# Patient Record
Sex: FEMALE | Race: WHITE | Employment: FULL TIME | URBAN - METROPOLITAN AREA
[De-identification: names, ages, dates, MRNs, and addresses within clinical notes are randomized per-mention and may not be internally consistent; named-entity substitution may affect disease eponyms.]

---

## 2020-03-16 PROCEDURE — 88305 TISSUE EXAM BY PATHOLOGIST: CPT | Performed by: RADIOLOGY

## 2020-03-16 PROCEDURE — 88360 TUMOR IMMUNOHISTOCHEM/MANUAL: CPT | Performed by: RADIOLOGY

## 2020-03-16 PROCEDURE — 88377 M/PHMTRC ALYS ISHQUANT/SEMIQ: CPT | Performed by: RADIOLOGY

## 2020-03-26 PROBLEM — Z17.0 MALIGNANT NEOPLASM OF UPPER-OUTER QUADRANT OF LEFT BREAST IN FEMALE, ESTROGEN RECEPTOR POSITIVE (HCC): Status: ACTIVE | Noted: 2020-03-26

## 2020-03-26 PROBLEM — C50.412 MALIGNANT NEOPLASM OF UPPER-OUTER QUADRANT OF LEFT BREAST IN FEMALE, ESTROGEN RECEPTOR POSITIVE (HCC): Status: ACTIVE | Noted: 2020-03-26

## 2020-04-07 PROBLEM — Z51.11 ENCOUNTER FOR ANTINEOPLASTIC CHEMOTHERAPY: Status: ACTIVE | Noted: 2020-04-07

## 2020-06-18 ENCOUNTER — TELEPHONE (OUTPATIENT)
Dept: GENETICS | Facility: HOSPITAL | Age: 60
End: 2020-06-18

## 2020-06-18 NOTE — TELEPHONE ENCOUNTER
Spoke to Nakia Chandler as she had called inquiring about scheduling an appointment for genetic counseling.  Confirmed that it is typically covered by insurance as a specialty visit, and given that her daughter has a reported BRCA mutation, she would certainly quali

## 2020-08-20 ENCOUNTER — GENETICS ENCOUNTER (OUTPATIENT)
Dept: GENETICS | Facility: HOSPITAL | Age: 60
End: 2020-08-20
Attending: GENETIC COUNSELOR, MS
Payer: COMMERCIAL

## 2020-08-20 ENCOUNTER — NURSE ONLY (OUTPATIENT)
Dept: HEMATOLOGY/ONCOLOGY | Facility: HOSPITAL | Age: 60
End: 2020-08-20
Attending: GENETIC COUNSELOR, MS
Payer: COMMERCIAL

## 2020-08-20 DIAGNOSIS — C50.412 MALIGNANT NEOPLASM OF UPPER-OUTER QUADRANT OF LEFT BREAST IN FEMALE, ESTROGEN RECEPTOR POSITIVE (HCC): Primary | ICD-10-CM

## 2020-08-20 DIAGNOSIS — Z80.3 FAMILY HISTORY OF MALIGNANT NEOPLASM OF BREAST: ICD-10-CM

## 2020-08-20 DIAGNOSIS — Z17.0 MALIGNANT NEOPLASM OF UPPER-OUTER QUADRANT OF LEFT BREAST IN FEMALE, ESTROGEN RECEPTOR POSITIVE (HCC): Primary | ICD-10-CM

## 2020-08-20 PROCEDURE — 96040 HC GENETIC COUNSELING EA 30 MIN: CPT | Performed by: GENETIC COUNSELOR, MS

## 2020-08-20 PROCEDURE — 36415 COLL VENOUS BLD VENIPUNCTURE: CPT

## 2020-08-20 NOTE — PROGRESS NOTES
Reason for visit: Mrs. Deandre Barnes is a 17-year-old woman referred to genetic counseling due to her recent diagnosis of breast cancer and family history of breast cancer to discuss the option of genetic testing and how this may help with her management and aracelis Common Hereditary Cancers Panel) and which yielded negative results for all genes tested. She reports that her mother was also diagnosed with cervical cancer (age 39) and a maternal half-uncle who was a smoker passed away from lung cancer.   She is unaware develop cancer, rather that there is an increased chance for developing certain types of cancer. Cancer predisposing gene mutations can exist in males and females and can be passed on to both male and female offspring.   The pros and cons of cancer predisp Surinder. Due to her diagnosis of breast cancer in conjunction with her daughter’s early-onset breast cancer, she meets NCCN criteria for genetic testing.     Based on the above history and our discussion of genetic testing options, Mrs. Saul Evans decided to

## 2020-08-31 ENCOUNTER — TELEPHONE (OUTPATIENT)
Dept: GENETICS | Facility: HOSPITAL | Age: 60
End: 2020-08-31

## 2020-08-31 NOTE — TELEPHONE ENCOUNTER
LM for Ravi Adorno that genetic testing results are negative for all genes tested aside from VUS in BRIP1 ([id number]), but this does not in any way change her clinical management. She opted for 84 gene panel through [facility]-Cancer Panel).  Reassure

## 2020-11-05 ENCOUNTER — TELEPHONE (OUTPATIENT)
Dept: GENERAL RADIOLOGY | Facility: HOSPITAL | Age: 60
End: 2020-11-05

## 2020-11-05 NOTE — TELEPHONE ENCOUNTER
1120:  Attempt to contact for procedure explanation and instructions- unable to leave message voice mail full.

## 2020-11-09 ENCOUNTER — LAB ENCOUNTER (OUTPATIENT)
Dept: LAB | Age: 60
End: 2020-11-09
Attending: SURGERY
Payer: COMMERCIAL

## 2020-11-09 DIAGNOSIS — Z17.0 MALIGNANT NEOPLASM OF UPPER-OUTER QUADRANT OF LEFT BREAST IN FEMALE, ESTROGEN RECEPTOR POSITIVE (HCC): ICD-10-CM

## 2020-11-09 DIAGNOSIS — C50.412 MALIGNANT NEOPLASM OF UPPER-OUTER QUADRANT OF LEFT BREAST IN FEMALE, ESTROGEN RECEPTOR POSITIVE (HCC): ICD-10-CM

## 2020-11-10 NOTE — IMAGING NOTE
Spoke with pt and explanation given re wire loc done prior to surgery. Pt scheduled for sarika mastectomy and wire loc to axillary lymph node.  Told pt will start in preop and will travel to [facility] via wheelchair for procedure to be done by radiologist. Will retu

## 2020-11-12 ENCOUNTER — HOSPITAL ENCOUNTER (OUTPATIENT)
Facility: HOSPITAL | Age: 60
Discharge: HOME | End: 2020-11-13
Attending: SURGERY | Admitting: SURGERY
Payer: COMMERCIAL

## 2020-11-12 ENCOUNTER — HOSPITAL ENCOUNTER (OUTPATIENT)
Dept: MAMMOGRAPHY | Facility: HOSPITAL | Age: 60
Discharge: HOME | End: 2020-11-12
Attending: SURGERY
Payer: COMMERCIAL

## 2020-11-12 ENCOUNTER — ANESTHESIA (OUTPATIENT)
Dept: SURGERY | Facility: HOSPITAL | Age: 60
End: 2020-11-12
Payer: COMMERCIAL

## 2020-11-12 ENCOUNTER — ANESTHESIA EVENT (OUTPATIENT)
Dept: SURGERY | Facility: HOSPITAL | Age: 60
End: 2020-11-12
Payer: COMMERCIAL

## 2020-11-12 ENCOUNTER — APPOINTMENT (OUTPATIENT)
Dept: MAMMOGRAPHY | Facility: HOSPITAL | Age: 60
End: 2020-11-12
Attending: SURGERY
Payer: COMMERCIAL

## 2020-11-12 DIAGNOSIS — Z17.0 MALIGNANT NEOPLASM OF UPPER-OUTER QUADRANT OF LEFT BREAST IN FEMALE, ESTROGEN RECEPTOR POSITIVE (HCC): Primary | ICD-10-CM

## 2020-11-12 DIAGNOSIS — C50.912 MALIGNANT NEOPLASM OF LEFT BREAST IN FEMALE, ESTROGEN RECEPTOR POSITIVE, UNSPECIFIED SITE OF BREAST (HCC): ICD-10-CM

## 2020-11-12 DIAGNOSIS — Z17.0 MALIGNANT NEOPLASM OF LEFT BREAST IN FEMALE, ESTROGEN RECEPTOR POSITIVE, UNSPECIFIED SITE OF BREAST (HCC): ICD-10-CM

## 2020-11-12 DIAGNOSIS — C50.412 MALIGNANT NEOPLASM OF UPPER-OUTER QUADRANT OF LEFT BREAST IN FEMALE, ESTROGEN RECEPTOR POSITIVE (HCC): Primary | ICD-10-CM

## 2020-11-12 PROCEDURE — 07T60ZZ RESECTION OF LEFT AXILLARY LYMPHATIC, OPEN APPROACH: ICD-10-PCS | Performed by: PLASTIC SURGERY

## 2020-11-12 PROCEDURE — 88344 IMHCHEM/IMCYTCHM EA MLT ANTB: CPT | Performed by: SURGERY

## 2020-11-12 PROCEDURE — 0HUV0KZ SUPPLEMENT BILATERAL BREAST WITH NONAUTOLOGOUS TISSUE SUBSTITUTE, OPEN APPROACH: ICD-10-PCS | Performed by: SURGERY

## 2020-11-12 PROCEDURE — 77065 DX MAMMO INCL CAD UNI: CPT | Performed by: SURGERY

## 2020-11-12 PROCEDURE — 0HHV0NZ INSERTION OF TISSUE EXPANDER INTO BILATERAL BREAST, OPEN APPROACH: ICD-10-PCS | Performed by: SURGERY

## 2020-11-12 PROCEDURE — 76942 ECHO GUIDE FOR BIOPSY: CPT | Performed by: ANESTHESIOLOGY

## 2020-11-12 PROCEDURE — 88360 TUMOR IMMUNOHISTOCHEM/MANUAL: CPT | Performed by: SURGERY

## 2020-11-12 PROCEDURE — 3E0T3BZ INTRODUCTION OF ANESTHETIC AGENT INTO PERIPHERAL NERVES AND PLEXI, PERCUTANEOUS APPROACH: ICD-10-PCS | Performed by: ANESTHESIOLOGY

## 2020-11-12 PROCEDURE — 10035 PLMT SFT TISS LOCLZJ DEV 1ST: CPT | Performed by: SURGERY

## 2020-11-12 PROCEDURE — 0HTV0ZZ RESECTION OF BILATERAL BREAST, OPEN APPROACH: ICD-10-PCS | Performed by: PLASTIC SURGERY

## 2020-11-12 PROCEDURE — 88307 TISSUE EXAM BY PATHOLOGIST: CPT | Performed by: SURGERY

## 2020-11-12 PROCEDURE — 76098 X-RAY EXAM SURGICAL SPECIMEN: CPT | Performed by: SURGERY

## 2020-11-12 DEVICE — MATRIX ALLODERM SELECT MEDIUM: Type: IMPLANTABLE DEVICE | Site: BREAST | Status: FUNCTIONAL

## 2020-11-12 DEVICE — GRAFT ALLODERM CONTOUR LG PERF: Type: IMPLANTABLE DEVICE | Site: BREAST | Status: FUNCTIONAL

## 2020-11-12 DEVICE — MATRIX ALLODERM SELECT: Type: IMPLANTABLE DEVICE | Site: BREAST | Status: FUNCTIONAL

## 2020-11-12 RX ORDER — IBUPROFEN 600 MG/1
600 TABLET ORAL EVERY 4 HOURS PRN
Status: DISCONTINUED | OUTPATIENT
Start: 2020-11-12 | End: 2020-11-13

## 2020-11-12 RX ORDER — DIAZEPAM 5 MG/1
5 TABLET ORAL AS NEEDED
Status: DISCONTINUED | OUTPATIENT
Start: 2020-11-12 | End: 2020-11-12 | Stop reason: HOSPADM

## 2020-11-12 RX ORDER — DOCUSATE SODIUM 100 MG/1
100 CAPSULE, LIQUID FILLED ORAL 2 TIMES DAILY
Status: DISCONTINUED | OUTPATIENT
Start: 2020-11-12 | End: 2020-11-13

## 2020-11-12 RX ORDER — HYDROMORPHONE HYDROCHLORIDE 1 MG/ML
INJECTION, SOLUTION INTRAMUSCULAR; INTRAVENOUS; SUBCUTANEOUS
Status: COMPLETED
Start: 2020-11-12 | End: 2020-11-12

## 2020-11-12 RX ORDER — CEFAZOLIN SODIUM/WATER 2 G/20 ML
SYRINGE (ML) INTRAVENOUS
Status: DISPENSED
Start: 2020-11-12 | End: 2020-11-12

## 2020-11-12 RX ORDER — MORPHINE SULFATE 4 MG/ML
1 INJECTION, SOLUTION INTRAMUSCULAR; INTRAVENOUS EVERY 2 HOUR PRN
Status: DISCONTINUED | OUTPATIENT
Start: 2020-11-12 | End: 2020-11-13

## 2020-11-12 RX ORDER — ONDANSETRON 2 MG/ML
INJECTION INTRAMUSCULAR; INTRAVENOUS AS NEEDED
Status: DISCONTINUED | OUTPATIENT
Start: 2020-11-12 | End: 2020-11-12 | Stop reason: SURG

## 2020-11-12 RX ORDER — DEXAMETHASONE SODIUM PHOSPHATE 4 MG/ML
VIAL (ML) INJECTION AS NEEDED
Status: DISCONTINUED | OUTPATIENT
Start: 2020-11-12 | End: 2020-11-12 | Stop reason: SURG

## 2020-11-12 RX ORDER — IBUPROFEN 400 MG/1
400 TABLET ORAL EVERY 4 HOURS PRN
Status: DISCONTINUED | OUTPATIENT
Start: 2020-11-12 | End: 2020-11-13

## 2020-11-12 RX ORDER — IBUPROFEN 600 MG/1
600 TABLET ORAL EVERY 6 HOURS PRN
Qty: 30 TABLET | Refills: 0 | Status: SHIPPED | OUTPATIENT
Start: 2020-11-12 | End: 2020-11-13

## 2020-11-12 RX ORDER — POLYETHYLENE GLYCOL 3350 17 G/17G
17 POWDER, FOR SOLUTION ORAL DAILY PRN
Status: DISCONTINUED | OUTPATIENT
Start: 2020-11-12 | End: 2020-11-13

## 2020-11-12 RX ORDER — ACETAMINOPHEN 500 MG
1000 TABLET ORAL ONCE
Status: DISCONTINUED | OUTPATIENT
Start: 2020-11-12 | End: 2020-11-12 | Stop reason: HOSPADM

## 2020-11-12 RX ORDER — MEPERIDINE HYDROCHLORIDE 25 MG/ML
12.5 INJECTION INTRAMUSCULAR; INTRAVENOUS; SUBCUTANEOUS AS NEEDED
Status: DISCONTINUED | OUTPATIENT
Start: 2020-11-12 | End: 2020-11-12 | Stop reason: HOSPADM

## 2020-11-12 RX ORDER — BUPIVACAINE HYDROCHLORIDE 2.5 MG/ML
INJECTION, SOLUTION EPIDURAL; INFILTRATION; INTRACAUDAL AS NEEDED
Status: DISCONTINUED | OUTPATIENT
Start: 2020-11-12 | End: 2020-11-12 | Stop reason: SURG

## 2020-11-12 RX ORDER — MORPHINE SULFATE 4 MG/ML
2 INJECTION, SOLUTION INTRAMUSCULAR; INTRAVENOUS EVERY 2 HOUR PRN
Status: DISCONTINUED | OUTPATIENT
Start: 2020-11-12 | End: 2020-11-13

## 2020-11-12 RX ORDER — DOCUSATE SODIUM 100 MG/1
100 CAPSULE, LIQUID FILLED ORAL 2 TIMES DAILY PRN
Qty: 30 CAPSULE | Refills: 0 | Status: SHIPPED | OUTPATIENT
Start: 2020-11-12 | End: 2020-11-13

## 2020-11-12 RX ORDER — ROCURONIUM BROMIDE 10 MG/ML
INJECTION, SOLUTION INTRAVENOUS AS NEEDED
Status: DISCONTINUED | OUTPATIENT
Start: 2020-11-12 | End: 2020-11-12 | Stop reason: SURG

## 2020-11-12 RX ORDER — SODIUM CHLORIDE 9 MG/ML
INJECTION, SOLUTION INTRAVENOUS CONTINUOUS
Status: DISCONTINUED | OUTPATIENT
Start: 2020-11-12 | End: 2020-11-12 | Stop reason: HOSPADM

## 2020-11-12 RX ORDER — CEPHALEXIN 500 MG/1
500 CAPSULE ORAL 4 TIMES DAILY
Qty: 28 CAPSULE | Refills: 0 | Status: SHIPPED | OUTPATIENT
Start: 2020-11-12 | End: 2020-11-13

## 2020-11-12 RX ORDER — LIDOCAINE HYDROCHLORIDE 10 MG/ML
INJECTION, SOLUTION EPIDURAL; INFILTRATION; INTRACAUDAL; PERINEURAL AS NEEDED
Status: DISCONTINUED | OUTPATIENT
Start: 2020-11-12 | End: 2020-11-12 | Stop reason: SURG

## 2020-11-12 RX ORDER — CEFAZOLIN SODIUM/WATER 2 G/20 ML
2 SYRINGE (ML) INTRAVENOUS ONCE
Status: COMPLETED | OUTPATIENT
Start: 2020-11-12 | End: 2020-11-12

## 2020-11-12 RX ORDER — HYDROCODONE BITARTRATE AND ACETAMINOPHEN 5; 325 MG/1; MG/1
2 TABLET ORAL AS NEEDED
Status: DISCONTINUED | OUTPATIENT
Start: 2020-11-12 | End: 2020-11-12 | Stop reason: HOSPADM

## 2020-11-12 RX ORDER — HYDROCODONE BITARTRATE AND ACETAMINOPHEN 5; 325 MG/1; MG/1
1 TABLET ORAL EVERY 6 HOURS PRN
Qty: 14 TABLET | Refills: 0 | Status: SHIPPED | OUTPATIENT
Start: 2020-11-12 | End: 2020-11-13

## 2020-11-12 RX ORDER — CEFAZOLIN SODIUM/WATER 2 G/20 ML
2 SYRINGE (ML) INTRAVENOUS EVERY 8 HOURS
Status: DISCONTINUED | OUTPATIENT
Start: 2020-11-13 | End: 2020-11-13

## 2020-11-12 RX ORDER — HYDROCODONE BITARTRATE AND ACETAMINOPHEN 5; 325 MG/1; MG/1
1 TABLET ORAL AS NEEDED
Status: DISCONTINUED | OUTPATIENT
Start: 2020-11-12 | End: 2020-11-12 | Stop reason: HOSPADM

## 2020-11-12 RX ORDER — ONDANSETRON 2 MG/ML
4 INJECTION INTRAMUSCULAR; INTRAVENOUS EVERY 6 HOURS PRN
Status: DISCONTINUED | OUTPATIENT
Start: 2020-11-12 | End: 2020-11-13

## 2020-11-12 RX ORDER — IBUPROFEN 200 MG
200 TABLET ORAL EVERY 4 HOURS PRN
Status: DISCONTINUED | OUTPATIENT
Start: 2020-11-12 | End: 2020-11-13

## 2020-11-12 RX ORDER — NALOXONE HYDROCHLORIDE 0.4 MG/ML
80 INJECTION, SOLUTION INTRAMUSCULAR; INTRAVENOUS; SUBCUTANEOUS AS NEEDED
Status: DISCONTINUED | OUTPATIENT
Start: 2020-11-12 | End: 2020-11-12 | Stop reason: HOSPADM

## 2020-11-12 RX ORDER — ONDANSETRON 2 MG/ML
4 INJECTION INTRAMUSCULAR; INTRAVENOUS AS NEEDED
Status: DISCONTINUED | OUTPATIENT
Start: 2020-11-12 | End: 2020-11-12 | Stop reason: HOSPADM

## 2020-11-12 RX ORDER — SODIUM CHLORIDE, SODIUM LACTATE, POTASSIUM CHLORIDE, CALCIUM CHLORIDE 600; 310; 30; 20 MG/100ML; MG/100ML; MG/100ML; MG/100ML
INJECTION, SOLUTION INTRAVENOUS CONTINUOUS
Status: DISCONTINUED | OUTPATIENT
Start: 2020-11-12 | End: 2020-11-13

## 2020-11-12 RX ORDER — SCOLOPAMINE TRANSDERMAL SYSTEM 1 MG/1
1 PATCH, EXTENDED RELEASE TRANSDERMAL ONCE
Status: DISCONTINUED | OUTPATIENT
Start: 2020-11-12 | End: 2020-11-13

## 2020-11-12 RX ORDER — BISACODYL 10 MG
10 SUPPOSITORY, RECTAL RECTAL
Status: DISCONTINUED | OUTPATIENT
Start: 2020-11-12 | End: 2020-11-13

## 2020-11-12 RX ORDER — METOCLOPRAMIDE HYDROCHLORIDE 5 MG/ML
10 INJECTION INTRAMUSCULAR; INTRAVENOUS AS NEEDED
Status: DISCONTINUED | OUTPATIENT
Start: 2020-11-12 | End: 2020-11-12 | Stop reason: HOSPADM

## 2020-11-12 RX ORDER — MORPHINE SULFATE 4 MG/ML
4 INJECTION, SOLUTION INTRAMUSCULAR; INTRAVENOUS EVERY 2 HOUR PRN
Status: DISCONTINUED | OUTPATIENT
Start: 2020-11-12 | End: 2020-11-13

## 2020-11-12 RX ORDER — SODIUM CHLORIDE, SODIUM LACTATE, POTASSIUM CHLORIDE, CALCIUM CHLORIDE 600; 310; 30; 20 MG/100ML; MG/100ML; MG/100ML; MG/100ML
INJECTION, SOLUTION INTRAVENOUS CONTINUOUS
Status: DISCONTINUED | OUTPATIENT
Start: 2020-11-12 | End: 2020-11-12 | Stop reason: HOSPADM

## 2020-11-12 RX ORDER — MIDAZOLAM HYDROCHLORIDE 1 MG/ML
INJECTION INTRAMUSCULAR; INTRAVENOUS AS NEEDED
Status: DISCONTINUED | OUTPATIENT
Start: 2020-11-12 | End: 2020-11-12 | Stop reason: SURG

## 2020-11-12 RX ORDER — SODIUM PHOSPHATE, DIBASIC AND SODIUM PHOSPHATE, MONOBASIC 7; 19 G/133ML; G/133ML
1 ENEMA RECTAL ONCE AS NEEDED
Status: DISCONTINUED | OUTPATIENT
Start: 2020-11-12 | End: 2020-11-13

## 2020-11-12 RX ORDER — PHENYLEPHRINE HCL 10 MG/ML
VIAL (ML) INJECTION AS NEEDED
Status: DISCONTINUED | OUTPATIENT
Start: 2020-11-12 | End: 2020-11-12 | Stop reason: SURG

## 2020-11-12 RX ORDER — HYDROMORPHONE HYDROCHLORIDE 1 MG/ML
0.4 INJECTION, SOLUTION INTRAMUSCULAR; INTRAVENOUS; SUBCUTANEOUS EVERY 5 MIN PRN
Status: DISCONTINUED | OUTPATIENT
Start: 2020-11-12 | End: 2020-11-12 | Stop reason: HOSPADM

## 2020-11-12 RX ADMIN — ONDANSETRON 4 MG: 2 INJECTION INTRAMUSCULAR; INTRAVENOUS at 19:00:00

## 2020-11-12 RX ADMIN — ROCURONIUM BROMIDE 10 MG: 10 INJECTION, SOLUTION INTRAVENOUS at 15:49:00

## 2020-11-12 RX ADMIN — PHENYLEPHRINE HCL 100 MCG: 10 MG/ML VIAL (ML) INJECTION at 15:12:00

## 2020-11-12 RX ADMIN — BUPIVACAINE HYDROCHLORIDE 25 ML: 2.5 INJECTION, SOLUTION EPIDURAL; INFILTRATION; INTRACAUDAL at 15:16:00

## 2020-11-12 RX ADMIN — ROCURONIUM BROMIDE 40 MG: 10 INJECTION, SOLUTION INTRAVENOUS at 15:06:00

## 2020-11-12 RX ADMIN — SODIUM CHLORIDE, SODIUM LACTATE, POTASSIUM CHLORIDE, CALCIUM CHLORIDE: 600; 310; 30; 20 INJECTION, SOLUTION INTRAVENOUS at 17:01:00

## 2020-11-12 RX ADMIN — SODIUM CHLORIDE, SODIUM LACTATE, POTASSIUM CHLORIDE, CALCIUM CHLORIDE: 600; 310; 30; 20 INJECTION, SOLUTION INTRAVENOUS at 18:32:00

## 2020-11-12 RX ADMIN — CEFAZOLIN SODIUM/WATER 2 G: 2 G/20 ML SYRINGE (ML) INTRAVENOUS at 18:47:00

## 2020-11-12 RX ADMIN — MIDAZOLAM HYDROCHLORIDE 2 MG: 1 INJECTION INTRAMUSCULAR; INTRAVENOUS at 15:02:00

## 2020-11-12 RX ADMIN — DEXAMETHASONE SODIUM PHOSPHATE 8 MG: 4 MG/ML VIAL (ML) INJECTION at 15:11:00

## 2020-11-12 RX ADMIN — CEFAZOLIN SODIUM/WATER 2 G: 2 G/20 ML SYRINGE (ML) INTRAVENOUS at 15:15:00

## 2020-11-12 RX ADMIN — LIDOCAINE HYDROCHLORIDE 50 MG: 10 INJECTION, SOLUTION EPIDURAL; INFILTRATION; INTRACAUDAL; PERINEURAL at 15:06:00

## 2020-11-12 RX ADMIN — BUPIVACAINE HYDROCHLORIDE 25 ML: 2.5 INJECTION, SOLUTION EPIDURAL; INFILTRATION; INTRACAUDAL at 15:21:00

## 2020-11-12 NOTE — H&P
Sammy Bailon 15 Patient Status:  Outpatient in a Bed    1960 MRN [id number]   Location [address] [place] [address] Attending Dada Fairchild MD   Hosp Day # 0 PCP Solomon Garibay MD     Active Problems:    * No active h Medication Sig Dispense Refill   • furosemide 20 MG Oral Tab Take 1 tablet (20 mg total) by mouth daily. 30 tablet 1   • Rivaroxaban (XARELTO) 20 MG Oral Tab Take 1 tablet (20 mg total) by mouth daily with food.  90 tablet 1   • Albuterol Sulfate 108 (90 TOMOSYNTHESIS  -------------------------------------------------------  CLINICAL INDICATION:  Screening mammogram. Palpable left breast mass for 6 months. AGE: 62 years.    COMPARISON:  All prior mammograms dating back to 2017  TECHNIQUE:   Bilateral dolly not well seen in the CC projection due  to the large size of the mass which obscures this area. Within the upper outer quadrant left breast is a cluster of indeterminate microcalcifications  measuring 4 mm.   BILATERAL BREAST ULTRASOUND:  Bilateral four-qu mm. Appropriate surgical/oncological management is recommended. Final pathology for left breast calcifications at 1:00 demonstrate breast tissue with stromal  fibrosis and rare microcalcifications. Consider breast MRI to evaluate extent of disease.  Resul demonstrate the Q and vision clips at  the expected location, respectively.  =====  IMPRESSION:  Ultrasound-guided core needle biopsy of a 3:00 left breast mass and a left axillary lymph node.   Pathology is pending and an addendum will be submitted with ra adenopathy. Sara Sanchez ------------------------------------  LEFT BREAST:  *In the lateral, slightly inferior, posterior aspect of the breast, there is a biopsy clip  identified with surrounding very mild enhancement.  This has significantly decrease in size compare above.            Jorge Rosemary  11/12/2020

## 2020-11-12 NOTE — ANESTHESIA PREPROCEDURE EVALUATION
PRE-OP EVALUATION    Patient Name: Roosvelt Brunner    Pre-op Diagnosis: Malignant neoplasm of left breast in female, estrogen receptor positive, unspecified site of breast ([facility].) [C50.912, Z17.0]    Procedure(s):  LEFT BREAST MODIFIED RADICAL MASTECTOMY WIT wall motion abnormalities. 2. Pericardium, extracardiac: There is no significant pericardial effusion.     Impressions:  No previous study was available for comparison.     Prepared and signed by  Mendez Wadsworth  04/20/2020 12:10    ECG reviewed.   Exercis guidelines. Post-procedure pain management plan discussed with surgeon and patient.   Surgeon requests: regional block  Comment: The risks and benefits of regional anesthesia have been explained to the patient as indicated on the anesthesia consent form,

## 2020-11-12 NOTE — ANESTHESIA PROCEDURE NOTES
Airway  Date/Time: 11/12/2020 3:06 PM  Urgency: elective    Airway not difficult    General Information and Staff    Patient location during procedure: OR  Anesthesiologist: Cynthia More MD  Performed: anesthesiologist     Indications and Patient Conditio

## 2020-11-12 NOTE — IMAGING NOTE
Assisted Dr. Caryle Cowing with needle localization of left axilla. Procedure explained and all questions answered. Pt verbalized understanding. Emotional support provided and pt tolerated procedure well with minimal discomfort.  Wire(s) secured with Tegaderm marita

## 2020-11-12 NOTE — ANESTHESIA PROCEDURE NOTES
Regional Block  Performed by: Thania Jeffrey MD  Authorized by: Thania Jeffrey MD       General Information and Staff    Start Time:  11/12/2020 3:12 PM  End Time:  11/12/2020 3:21 PM  Anesthesiologist:  Thania Jeffrey MD  Patient Location:  OR    [address]

## 2020-11-12 NOTE — INTERVAL H&P NOTE
Pre-op Diagnosis: Malignant neoplasm of left breast in female, estrogen receptor positive, unspecified site of breast ([facility].) [C50.912, Z17.0]    The above referenced H&P was reviewed by Johnny Beckwith DO on 11/12/2020, the patient was examined and no sig

## 2020-11-12 NOTE — OPERATIVE REPORT
Pre-Operative Diagnosis: Malignant neoplasm of left breast in female, estrogen receptor positive, unspecified site of breast ([facility] [C50.912, Z17.0]     Post-Operative Diagnosis: Malignant neoplasm of left breast in female, estrogen receptor positive, unspe

## 2020-11-12 NOTE — BRIEF OP NOTE
Pre-Operative Diagnosis: Malignant neoplasm of left breast in female, estrogen receptor positive, unspecified site of breast ([facility].) [C50.912, Z17.0]     Post-Operative Diagnosis: Malignant neoplasm of left breast in female, estrogen receptor positive, unspe

## 2020-11-13 VITALS
RESPIRATION RATE: 18 BRPM | TEMPERATURE: 98 F | HEIGHT: 62 IN | HEART RATE: 103 BPM | BODY MASS INDEX: 22.96 KG/M2 | DIASTOLIC BLOOD PRESSURE: 63 MMHG | OXYGEN SATURATION: 91 % | WEIGHT: 124.75 LBS | SYSTOLIC BLOOD PRESSURE: 111 MMHG

## 2020-11-13 PROCEDURE — 85027 COMPLETE CBC AUTOMATED: CPT | Performed by: PLASTIC SURGERY

## 2020-11-13 RX ORDER — DOCUSATE SODIUM 100 MG/1
100 CAPSULE, LIQUID FILLED ORAL 2 TIMES DAILY PRN
Qty: 30 CAPSULE | Refills: 0 | Status: SHIPPED | OUTPATIENT
Start: 2020-11-13 | End: 2021-03-08

## 2020-11-13 RX ORDER — IBUPROFEN 600 MG/1
600 TABLET ORAL EVERY 6 HOURS PRN
Qty: 30 TABLET | Refills: 0 | Status: SHIPPED | OUTPATIENT
Start: 2020-11-13 | End: 2021-03-08

## 2020-11-13 RX ORDER — CEPHALEXIN 500 MG/1
500 CAPSULE ORAL 4 TIMES DAILY
Qty: 28 CAPSULE | Refills: 0 | Status: SHIPPED | OUTPATIENT
Start: 2020-11-13 | End: 2020-11-20

## 2020-11-13 RX ORDER — HYDROCODONE BITARTRATE AND ACETAMINOPHEN 5; 325 MG/1; MG/1
1 TABLET ORAL EVERY 6 HOURS PRN
Qty: 14 TABLET | Refills: 0 | Status: SHIPPED | OUTPATIENT
Start: 2020-11-13 | End: 2021-03-08

## 2020-11-13 NOTE — OPERATIVE REPORT
[facility]    PATIENT'S NAME: Joce Watts   ATTENDING PHYSICIAN: Ascencion Severance, M.D. OPERATING PHYSICIAN: Ascencion Severance, M.D.    PATIENT ACCOUNT#:   [de-identified]    LOCATION:  [address] [place] [address]  MEDICAL RECORD #:   [id number]       DATE OF BIRTH:  [birth year] scalpel and carried down to the breast parenchyma. Using electrocautery, the mastectomy flaps were created to the clavicle, sternum, inframammary fold, and latissimus muscle.   I then dissected the breast off the pectoralis major muscle taking care to take palpable. The wound was irrigated, suctioned dry, examined for bleeding, hemostasis was meticulously maintained. I examined the mastectomy flaps of both sides; they were noted to be of even and appropriate thickness.   At the end of my procedure, sponge a

## 2020-11-13 NOTE — BRIEF OP NOTE
Pre-Operative Diagnosis: ACQUIRED ABSENCE OF BILATERAL BREASTS AND NIPPLE, H/O LEFT SIDED BREAST CA S/P MASTECTOMIES     Post-Operative Diagnosis: ACQUIRED ABSENCE OF BILATERAL BREASTS AND NIPPLE, H/O LEFT SIDED BREAST CA S/P MASTECTOMIES     Procedure Per

## 2020-11-13 NOTE — PLAN OF CARE
Problem: Patient/Family Goals  Goal: Patient/Family Long Term Goal  Description: Patient's Long Term Goal: discharge    Interventions:  - ambulation  - See additional Care Plan goals for specific interventions  Outcome: Progressing  Goal: Patient/Family

## 2020-11-13 NOTE — CM/SW NOTE
Home 02 study documentation needs to state the following:    Walk/Test/Clinical/Progress Note    Test Date:    SPO2% on Room Air at rest:   SPO2% Ambulation on Room Air:  SPO2% Ambulation on O2:     On     Liters per Minute    Patient was assessed, qualifi

## 2020-11-13 NOTE — ANESTHESIA POSTPROCEDURE EVALUATION
Sammy Bailon 15 Patient Status:  Outpatient in a Bed   Age/Gender 56year old female MRN [id number]   [facility] SURGERY Attending Isadora Hammonds MD   Hosp Day # 0 PCP Wilberto Phillips MD       Anesthesia Post-op Note    Pr

## 2020-11-13 NOTE — CM/SW NOTE
MSW heard from Hector Hernandez at [facility]. Patient's insurance approved. RN will get a tank from respiratory for the patient to transfer home with. Patient needs to call [phone] to schedule delivery of equipment this evening.     Maryann Rodriguez, [facility]

## 2020-11-13 NOTE — PROGRESS NOTES
Plastic Surgery Progress Note    POD 1 from bilateral mastectomies, left axillary lymph node dissection and immediate reconstruction with TE and ADM.   No events o/n  Tolerating po  Pain controlled   Ambulating  Voided without difficulty     11/13/20  0855

## 2020-11-13 NOTE — PLAN OF CARE
Patient tried calling [facility], office closed. RN, writer of this note, called [facility] Reyn[facility], spoke with [employer]. [employer] will call  to set up delivery tonight. RN explained to [employer] patient will be discharged soon, and current 02 tank is for 5 hours.  [employer] stated

## 2020-11-13 NOTE — PROGRESS NOTES
Patient alert and oriented. On 4L of O2 per NC on admission from PACU. Weaned to 2L, maintaining sats above 92% at this time. Advanced to soft diet this AM, patient has active bowel sounds. Tolerated clears during night.   States she does not have much p

## 2020-11-13 NOTE — CM/SW NOTE
[facility] is out of network with the [facility]. MSW re-sent to [place] [employer] [place]. Awaiting response.     PATRICA WuW

## 2020-11-13 NOTE — PLAN OF CARE
B  igned                          Walk/Test/Clinical/Progress Note     Test Date: 11/13/2020     SPO2% on Room Air at rest: 88%  SPO2% Ambulation on Room Air: 80%  SPO2% Ambulation on O2: 96%    On   2  Liters per Minute     Patient was assessed, qualifi

## 2020-11-13 NOTE — CONSULTS
.  Reason for consult: periop mgmt    Consulted by: Dr. Tangela Roman    PCP: Reno Bryan MD      History of Present Illness: Patient is a 56year old female with PMH sig for L breast cancer s/p neoaduvant chemo, h/o RUE DVT after port placement and on x Tab, Take 1 tablet (20 mg total) by mouth daily with food. , Disp: 90 tablet, Rfl: 1    •  lidocaine-prilocaine (EMLA) 2.5-2.5 % External Cream, Apply 1 Application topically as needed (apply 1 hour prior to port access on chemotherapy days).  Use as directe conjunctival pallor, EOMs intact. Nose: Nares normal. Septum midline. Mucosa normal. No drainage.    Throat: Lips, mucosa, and tongue normal. Teeth and gums normal.   Neck: Supple, symmetrical, trachea midline, no cervical or supraclavicular lymph adenop Vision COMPLICATIONS:  None. SPECIMEN RADIOGRAPH:  The clip was included in the surgical specimen. CONCLUSION:  1. Successful ultrasound guided needle localization of the axillary node.  2. Surgical specimen radiograph demonstrated the clip in th pulmonologist outlining possible need for O2.  - encourage aggressive IS, pt knows how to do this. - do not see need for chest xray, pt moving air b/l, no fevers, afebrile, labs okay.  Just had ct chest last month  - O2 walk being done this morning  - set

## 2020-11-13 NOTE — PLAN OF CARE
02 sats 80% while walking, complaints of shortness of breath. Back from walk, sitting in bed 88% on RA.     2L 02 NC, with 02 sats of 93-96%

## 2020-11-14 NOTE — OPERATIVE REPORT
[facility]    PATIENT'S NAME: Matt James   ATTENDING PHYSICIAN: Magdiel Colon M.D. OPERATING PHYSICIAN: Zainab Stevens DO   PATIENT ACCOUNT#:   [de-identified]    LOCATION:  [address] [place] [address]  MEDICAL RECORD #:   [id number]       DATE OF BIRTH: thorough discussion with the patient, she agreed to proceed with implant-based reconstruction using a tissue expander followed by a second stage using implants.   She understands that the projected need for radiation therapy may increase her complication pr table, I sutured 2 large thick perforated pieces of AlloDerm together with a running 3-0 PDS suture and then a pursestring suture was placed. Then, the right breast was copiously irrigated.   Hemostasis was ensured with electrocautery and the chest dimensi By Antonia Waddell DO  d: 11/13/2020 09:06:51  t: 11/13/2020 19:16:49  [facility] [id number]  ULISES/

## 2020-11-14 NOTE — PLAN OF CARE
Patients IV d/c'd, catheter intact. All discharge instructions explained, all questions answered. MAGALI care explained, supplies given. Log sheet given. Cintia Casillas will call patient [facility] to deliver oxygen.  Patient will be discharged via wheelchair with support

## 2020-12-01 PROBLEM — Z98.890 HISTORY OF RECONSTRUCTION OF BOTH BREASTS: Status: ACTIVE | Noted: 2020-12-01

## 2022-03-15 NOTE — H&P (VIEW-ONLY)
Vandana Miranda is a 56year old female. HPI:   Chief Complaint: Patient presents with:  Consult: Breast Reconstruction ref Dr. Nando Izaguirre     Patient is a 55 yo female who presents today referred by Dr Nando Izaguirre to discuss breast reconstruction.  She was diagnosed tablet 0   • furosemide 20 MG Oral Tab Take 1 tablet (20 mg total) by mouth daily. 30 tablet 1   • Rivaroxaban (XARELTO) 20 MG Oral Tab Take 1 tablet (20 mg total) by mouth daily with food.  90 tablet 1   • lidocaine-prilocaine (EMLA) 2.5-2.5 % External Cre ptosis. No palpable masses. No nipple retraction or discharge. No axillary lymphadenopathy. BW~ 11.5-12cm  SN to nipple 24cm on the right.      DATE OF SERVICE: 02.01.2020     BILATERAL SCREENING MAMMOGRAM WITH CAD WITH TOMOSYNTHESIS  -------------------- threshold.  -------------------------------------------------------   This study was interpreted by Francisco Carney MD.      Study Result    DATE OF SERVICE: [phone]     BILATERAL BREAST MRI   CLINICAL INDICATION: Left breast cancer  COMPARISON:Prior M tissues and bony structures demonstrate no acute findings. ------------------------------------  IMPRESSION  1.  Significantly decreased size of area of enhancing mass in the left breast. Axillary lymph nodes  have also significantly decrease in size altho infection, seroma, implant extrusion, potential failure of reconstruction, asymmetry, pain. I explained that the use of tissue expander does not burn any bridges and she can opt to have an implant vs autologous based reconstruction.  She knows radiation NEGATIVE

## 2022-04-15 ENCOUNTER — OFFICE VISIT (OUTPATIENT)
Dept: SURGERY | Facility: CLINIC | Age: 62
End: 2022-04-15
Payer: COMMERCIAL

## 2022-04-15 VITALS
WEIGHT: 123 LBS | HEART RATE: 85 BPM | DIASTOLIC BLOOD PRESSURE: 92 MMHG | OXYGEN SATURATION: 97 % | BODY MASS INDEX: 22.63 KG/M2 | SYSTOLIC BLOOD PRESSURE: 149 MMHG | RESPIRATION RATE: 14 BRPM | HEIGHT: 62 IN

## 2022-04-15 DIAGNOSIS — Z98.890 HISTORY OF RECONSTRUCTION OF BOTH BREASTS: Primary | ICD-10-CM

## 2022-04-15 PROCEDURE — 99203 OFFICE O/P NEW LOW 30 MIN: CPT | Performed by: SURGERY

## 2022-04-15 PROCEDURE — 3008F BODY MASS INDEX DOCD: CPT | Performed by: SURGERY

## 2022-04-15 PROCEDURE — 3080F DIAST BP >= 90 MM HG: CPT | Performed by: SURGERY

## 2022-04-15 PROCEDURE — 3077F SYST BP >= 140 MM HG: CPT | Performed by: SURGERY

## 2022-04-18 ENCOUNTER — TELEPHONE (OUTPATIENT)
Dept: SURGERY | Facility: CLINIC | Age: 62
End: 2022-04-18

## 2022-05-23 ENCOUNTER — TELEPHONE (OUTPATIENT)
Dept: SURGERY | Facility: CLINIC | Age: 62
End: 2022-05-23

## 2022-05-23 DIAGNOSIS — Z98.890 HISTORY OF RECONSTRUCTION OF BOTH BREASTS: Primary | ICD-10-CM

## 2022-05-23 NOTE — TELEPHONE ENCOUNTER
Calling pt in regards to scheduling surgery. Informed pt that I have 10/13/2022 available at [facility]. with Dr. Eri Holt. Pt verbalized understanding and in agreement with date and location. All questions answered. Encouraged pt to call or [employer] message office with any other questions or concerns.

## 2022-07-26 DIAGNOSIS — Z98.890 HISTORY OF RECONSTRUCTION OF BOTH BREASTS: Primary | ICD-10-CM

## 2022-12-08 ENCOUNTER — TELEPHONE (OUTPATIENT)
Dept: SURGERY | Facility: CLINIC | Age: 62
End: 2022-12-08

## 2022-12-08 NOTE — TELEPHONE ENCOUNTER
Patient LVM requesting call back. Patient was called back and we discussed what procedures she is interested in. Patient was informed it is noted in her visit note the procedures she would like to discuss. Patient appreciative of call.

## 2022-12-28 ENCOUNTER — TELEPHONE (OUTPATIENT)
Dept: SURGERY | Facility: CLINIC | Age: 62
End: 2022-12-28

## 2022-12-28 DIAGNOSIS — Z98.890 HISTORY OF RECONSTRUCTION OF BOTH BREASTS: Primary | ICD-10-CM

## 2022-12-28 NOTE — TELEPHONE ENCOUNTER
Patient called and informed me she will be cancelling her surgery with Dr. Mabel Adorno on 02/23/2023 and would like to not reschedule at this time

## 2023-03-07 ENCOUNTER — OFFICE VISIT (OUTPATIENT)
Dept: SURGERY | Facility: CLINIC | Age: 63
End: 2023-03-07
Payer: COMMERCIAL

## 2023-03-07 DIAGNOSIS — Z98.890 HISTORY OF RECONSTRUCTION OF BOTH BREASTS: Primary | ICD-10-CM

## 2023-03-07 PROCEDURE — 99212 OFFICE O/P EST SF 10 MIN: CPT | Performed by: SURGERY

## 2023-03-07 NOTE — PROGRESS NOTES
Aydee Randolph is a 57year old female who presents today for a follow-up. She was initially scheduled for bilateral capsulectomy and implant removal but had to cancel due to COVID illness. She now wishes to proceed with surgery. Physical Examination:  Bilateral breast show absence of the nipple areolar complex with well-healed transverse scars breasts:. Expanded tissue expanders in place. The left breast is noted with mild hyperpigmentation and fibrosis consistent with radiation change. There are no palpable nodules noted bilaterally. There is no erythema or seroma noted. There is no axillar lymphadenopathy noted. Lymph Nodes: There is no cervical, supraclavicular, or axillary lymphadenopathy appreciated. Assessment and Plan: We reviewed the plan for bilateral capsulectomy and implant removal with excision of the skin excess bilaterally. The nature of the procedure was discussed in detail with the patient. We reviewed the likely need to extend the length of the breast incisions. We discussed the risks of surgery including but not limited to bleeding, infection, scarring, delayed wound healing, injury to adjacent structures, seroma, contour abnormalities, and need for secondary revision procedures. The patient was counseled on the elevated risk of wound healing difficulties of the left breast given her previous radiation. We also discussed possible need for revisions in the future. We discussed the expected postoperative course including need for activity limitation, drains, and compression. Multiple questions were answered the patient satisfaction. No guarantees as to outcome were offered. The patient expresses understanding wishes to proceed.

## 2023-03-07 NOTE — PATIENT INSTRUCTIONS
Surgeon:         Dr. Tyler Calloway                                        Tel:         [phone]                                  Fax:        [phone]    Surgery/Procedure:   Bilateral breast capsulectomy and implant removal   2.5 hours, general anesthesia, outpatient                                               Hospital:  [facility]: [address] [place] [address] [address], [place], [address]           [phone]  [facility]: [address], [facility]               [phone]    1. Someone will need to drive you to and from the hospital if your procedure is outpatient. 2.Do not drink alcohol or smoke 24 hours prior to your procedure. 3. Bring a picture ID and your insurance card. 4. You will be contacted by the hospital the day before to confirm the procedure time and location. 5. The hospital will also contact you approximately one week before surgery to schedule your COVID test.     6. Do not take any herbal supplements or blood thinners at least one week before your procedure/surgery. This includes NSAID's (aspirin, baby aspirin, Motrin, Ibuprofen, Aleve, Advil, Naproxen, etc), Plavix, fish oil, vitamin E, turmeric, CoQ10, or green tea supplements, etc. *TYLENOL or acetaminophen is ok to take*    7. PRE-OPERATIVE TESTING: History and physical with medical clearance is REQUIRED within 30 days of the surgery date and is mandatory per Dr. Aleena Rojas. *If this is not done, your surgery will be postponed*  MEDICAL CLEARANCE WITH DR. Carmenza Hawkins  CBC  CMP  EKG  Discuss when to stop phentermine with prescribing provider  Pulmonary Clearance -Dr. Moon Alves  DVT prophylaxis recommendations from hematology/oncology- Dr. Jhoana Bourgeois    8. Please inform us if you develop any Covid-19 like symptoms, test positive or have been exposed for Covid- 19 prior to surgery.      Consent obtained   Photos taken on 3/7/2023 Performed Resulted

## 2023-03-16 ENCOUNTER — TELEPHONE (OUTPATIENT)
Dept: SURGERY | Facility: CLINIC | Age: 63
End: 2023-03-16

## 2023-03-30 ENCOUNTER — TELEPHONE (OUTPATIENT)
Dept: SURGERY | Facility: CLINIC | Age: 63
End: 2023-03-30

## 2023-03-30 NOTE — TELEPHONE ENCOUNTER
LVM to patient to call the office back to get scheduled for surgery as our office received her pulmonology clearance but she is not yet scheduled with Dr. Tatum Reynolds.

## 2024-07-10 ENCOUNTER — HOSPITAL ENCOUNTER (OUTPATIENT)
Dept: CT IMAGING | Facility: HOSPITAL | Age: 64
Discharge: HOME | End: 2024-07-10
Attending: STUDENT IN AN ORGANIZED HEALTH CARE EDUCATION/TRAINING PROGRAM

## 2024-07-10 DIAGNOSIS — Z13.6 SCREENING FOR CARDIOVASCULAR CONDITION: ICD-10-CM

## 2024-07-11 NOTE — PROGRESS NOTES
Date of Service 7/10/2024    CHRISSY MALDONADO  Date of Birth 9/26/1960    Patient Age: 63 year old    PCP: DO Duncan Amezcua[address]  [facility] [address]    Heart Scan Consult  Preliminary Heart Scan Score: 286    Previous Screening  Heart Scan Completed Previously: No        Peripheral Vascular Scan Completed Previously: No          Risk Factors  Personal Risk Factors  Non-alterable Risk Factors: Personal History;Age;Gender;Family History  Alterable Risk Factors: Abnormal Cholesterol          Blood Pressure  There were no vitals taken for this visit.  (Normal =< 120/80,  Elevated = 120-129/ >80,  High Stage1 130-139/80-89 , Stage2 >140/>90)    Lipid Profile  Cholesterol Modification (goal of therapy depends upon your risk):   Increase HDL (Healthy/Good) Normal >45 Men >55 Women;  Decrease LDL (Lousy/Bad) Ideal <100;  Decrease Triglycerides (Ugly) Normal <150    (Today's NON-FASTING Cholestech Values:  Total Cholesterol-172, HDL-59, LDL-96, Triglycerides-80, Glucose-90)      Cholesterol Goals  Value   Total  =< 200   HDL  = > 45 Men = > 55 Women   LDL   =< 100   Triglycerides  =< 150       Glucose and Hemoglobin A1C  Lab Results   Component Value Date     (H) 07/08/2021     (Normal Fasting Glucose < 100mg/dl )    Nurse Review  Risk factor information and results reviewed with Nurse: Yes    Recommended Follow Up:  Consult your physician regarding::   Final Heart Scan Report;  Discuss potential for Incidental Finding;  Discuss Potential for Score Variance      Recommendations for Change:  Nutrition Changes: Low Saturated Fat;Low Fat Dairy;Increase Fiber        Exercise: Enhance Current Program                   Repeat Heart Scan:   3 Years if Calcium Score is > 0.0;  Discuss with your Physician              [place] Recommended Resources:  Recommended Resources: Upcoming Classes, Medical Services and Health Library [email or web address];    PV Screening  Recommended PV Screening:  Carotids;Abdomen;Ankle-Brachial Index (REMA)      Other Resources:: Educational handouts provided.      Junior WEEKS RN        Please Contact the Nurse Heart Line with any Questions or Concerns [phone].

## (undated) DEVICE — REDUCER FITTING (NON-STERILE) 7/8 IN (22 MM) TO 1/4 IN (6.4 MM): Brand: CONMED BUFFALO FILTER

## (undated) DEVICE — SUTURE PDS II 0 CT-1

## (undated) DEVICE — SYRINGE 50ML LL TIP

## (undated) DEVICE — VIOLET BRAIDED (POLYGLACTIN 910), SYNTHETIC ABSORBABLE SUTURE: Brand: COATED VICRYL

## (undated) DEVICE — SCRUB PVP -1 PREP SOLUTION 4OZ

## (undated) DEVICE — STERILE POLYISOPRENE POWDER-FREE SURGICAL GLOVES: Brand: PROTEXIS

## (undated) DEVICE — HEMOCLIP HORIZON SM 001200

## (undated) DEVICE — Device

## (undated) DEVICE — CHLORAPREP ORANGE TINT 10.5ML

## (undated) DEVICE — MARKER SKIN PREP RESIST STRL

## (undated) DEVICE — STERILE LATEX POWDER-FREE SURGICAL GLOVES WITH HYDROGEL COATING, SMOOTH FINISH, STRAIGHT FINGER: Brand: PROTEXIS

## (undated) DEVICE — SUTURE VICRYL 3-0 SH

## (undated) DEVICE — BLADE ELECTRODE: Brand: EDGE

## (undated) DEVICE — SUTURE ETHILON 3-0 PS-1

## (undated) DEVICE — PAD SACRAL SPAN AID

## (undated) DEVICE — Device: Brand: PLUMEPEN

## (undated) DEVICE — LIGHT HANDLE

## (undated) DEVICE — MEDI-VAC NON-CONDUCTIVE SUCTION TUBING: Brand: CARDINAL HEALTH

## (undated) DEVICE — 3M™ IOBAN™ 2 ANTIMICROBIAL INCISE DRAPE 6651EZ: Brand: IOBAN™ 2

## (undated) DEVICE — 3M(TM) TEGADERM(TM) TRANSPARENT FILM DRESSING FRAME STYLE 1628: Brand: 3M™ TEGADERM™

## (undated) DEVICE — CAUTERY BLADE 2IN INS E1455

## (undated) DEVICE — UNDYED BRAIDED (POLYGLACTIN 910), SYNTHETIC ABSORBABLE SUTURE: Brand: COATED VICRYL

## (undated) DEVICE — DERMABOND LIQUID ADHESIVE

## (undated) DEVICE — UNDERPAD 23X36 LIGHT ASBORB

## (undated) DEVICE — DRAIN CHANNEL 19FR BLAKE

## (undated) DEVICE — BULB SYRINGE,IRRIGATION WITH PROTECTIVE CAP: Brand: DOVER

## (undated) DEVICE — SPONGE STICK WITH PVP-I: Brand: KENDALL

## (undated) DEVICE — CONT SPEC SURG FAXITRON WEDGE

## (undated) DEVICE — MEDI-VAC SUCTION HANDLE REGULAR CAPACITY: Brand: CARDINAL HEALTH

## (undated) DEVICE — PLASTIC BREAST CDS-LF: Brand: MEDLINE INDUSTRIES, INC.

## (undated) DEVICE — LAPAROTOMY SPONGE - RF AND X-RAY DETECTABLE PRE-WASHED: Brand: SITUATE

## (undated) DEVICE — SOL  .9 1000ML BAG

## (undated) DEVICE — SPECIMEN CONTAINER,POSITIVE SEAL INDICATOR, OR PACKAGED: Brand: PRECISION

## (undated) DEVICE — SUTURE MONOCRYL 4-0 PS-2

## (undated) DEVICE — DRAIN RESERVOIR RELIAVAC 100CC

## (undated) DEVICE — SOL  .9 1000ML BTL

## (undated) DEVICE — KENDALL SCD EXPRESS SLEEVES, KNEE LENGTH, MEDIUM: Brand: KENDALL SCD

## (undated) DEVICE — DRESSING BIOPATCH 1X4 CNTR

## (undated) DEVICE — PROXIMATE SKIN STAPLERS (35 WIDE) CONTAINS 35 STAINLESS STEEL STAPLES (FIXED HEAD): Brand: PROXIMATE

## (undated) DEVICE — DRAPE HALF 40X58 DYNJP2410

## (undated) DEVICE — SUPER SPONGES,MEDIUM: Brand: KERLIX

## (undated) DEVICE — CLEAR MONOFILAMENT (POLYDIOXANONE), ABSORBABLE SURGICAL SUTURE: Brand: PDS